# Patient Record
Sex: FEMALE | Race: WHITE | NOT HISPANIC OR LATINO
[De-identification: names, ages, dates, MRNs, and addresses within clinical notes are randomized per-mention and may not be internally consistent; named-entity substitution may affect disease eponyms.]

---

## 2022-06-30 ENCOUNTER — NON-APPOINTMENT (OUTPATIENT)
Age: 77
End: 2022-06-30

## 2022-06-30 PROBLEM — Z00.00 ENCOUNTER FOR PREVENTIVE HEALTH EXAMINATION: Status: ACTIVE | Noted: 2022-06-30

## 2022-07-05 ENCOUNTER — APPOINTMENT (OUTPATIENT)
Dept: NEUROSURGERY | Facility: CLINIC | Age: 77
End: 2022-07-05

## 2022-07-05 PROCEDURE — 99205 OFFICE O/P NEW HI 60 MIN: CPT

## 2022-07-05 NOTE — DATA REVIEWED
[de-identified] : MRI BRAIN AND IACS WITH AND WITHOUT CONTRAST: 6/28/22: IMPRESSION: 1. 5 x 4 x 4 mm enhancing lesion with broad dural base along the petrous left \par temporal bone and roof of the left IAC with resultant narrowing the porus \par acusticus with suggested encroachment on the left cranial nerve 7/8 complex at \par this level. Finding is suggestive of a meningioma rather than vestibular \par schwannoma. \par 2. Mild chronic microvascular ischemic changes without acute infarct or abnormal \par enhancement.

## 2022-07-05 NOTE — ASSESSMENT
[FreeTextEntry1] : Ms. Leslie presents with a history of left sided asymmetric sensorineural hearing loss. MRI brain and IACs with and without gadolinium 6/28/22 reviewed independently by me demonstrates a 5 x 4 x 4 mm enhancing lesion with broad dural base along the petrous left temporal bone and roof of the left IAC with resultant narrowing of the porus acusticus with suggested encroachment upon the left cranial nerve VII/VIII complex at this level. 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  \par \par Natural history discussed with the patient and her  in detail. Alternative management strategies reviewed. We discussed hearing preservation approaches for surgical resection, gamma knife radiosurgery, and clinical observation with surveillance audiometry and imaging. Given the patient's age, the small size of the lesion, and her symptomatic presentation, I have recommended consultation with Dr. Enio Barrios to discuss stereotactic radiosurgery in more detail. \par \par If the patient decides not to proceed with radiosurgical intervention, then I have offered to follow her clinically and obtain surveillance MRI brain and IACs with and without gadolinium in 6 months. I explained that she is at risk of progressive hearing loss, and have suggested audiometric surveillance as well, perhaps every 3 months though I would defer the timing of this to Dr. Morales. If the patient does decide to proceed with stereotactic radiosurgery, then Dr. Barrios will assume neurosurgical care of Ms Leslie and manage her longitudinally. \par \par I have asked the patient and her  to contact me for any symptomatic progression at which time we can obtain expedited follow-up imaging and discuss interventional options.\par \par A total of 60 minutes were spent relative to this encounter.\par \par \par

## 2022-07-05 NOTE — PHYSICAL EXAM
[General Appearance - Alert] : alert [General Appearance - In No Acute Distress] : in no acute distress [General Appearance - Well Nourished] : well nourished [Oriented To Time, Place, And Person] : oriented to person, place, and time [Impaired Insight] : insight and judgment were intact [Affect] : the affect was normal [Cranial Nerves Optic (II)] : visual acuity intact bilaterally,  pupils equal round and reactive to light [Cranial Nerves Oculomotor (III)] : extraocular motion intact [Cranial Nerves Trigeminal (V)] : facial sensation intact symmetrically [Cranial Nerves Facial (VII)] : face symmetrical [Cranial Nerves Glossopharyngeal (IX)] : tongue and palate midline [Cranial Nerves Accessory (XI - Cranial And Spinal)] : head turning and shoulder shrug symmetric [Cranial Nerves Hypoglossal (XII)] : there was no tongue deviation with protrusion [Motor Tone] : muscle tone was normal in all four extremities [Motor Strength] : muscle strength was normal in all four extremities [Sensation Tactile Decrease] : light touch was intact [Abnormal Walk] : normal gait [Balance] : balance was intact [FreeTextEntry5] : Decreased hearing ( left greater than right)

## 2022-07-05 NOTE — HISTORY OF PRESENT ILLNESS
[de-identified] : Ms. Leslie presents in neurosurgical consultation at the request of Dr. Primo Morales. She has a PMHx significant for Gilbert's syndrome, leukopenia, tubular adenoma, osteoporosis, hypothyroidism ,essential hypertension, wax impaction, osteoarthritis, sarcoidosis, CAD, atherosclerosis of the aorta, actinic keratosis, and basal cell carcinoma and acne, Dupuytren's contractures, hyperlipidemia, ectasia of left common iliac artery, thoracic aortic aneurysm, PAD. Patient reported several years of gradual hearing loss, left greater than right. Recent audiometric evaluation revealed left mild to severe sloping asymmetric, left sided sensorineural loss hearing loss and right borderline normal to mild sensorineural hearing loss. She tends to favor her right ear in social settings. Further workup prompted MRI brain and IACs (which is detailed below). Denies other neurological symptoms.

## 2022-07-14 ENCOUNTER — NON-APPOINTMENT (OUTPATIENT)
Age: 77
End: 2022-07-14

## 2022-07-20 ENCOUNTER — APPOINTMENT (OUTPATIENT)
Dept: NEUROSURGERY | Facility: CLINIC | Age: 77
End: 2022-07-20

## 2022-07-20 VITALS
HEIGHT: 67 IN | OXYGEN SATURATION: 96 % | BODY MASS INDEX: 19.15 KG/M2 | DIASTOLIC BLOOD PRESSURE: 87 MMHG | HEART RATE: 80 BPM | WEIGHT: 122 LBS | SYSTOLIC BLOOD PRESSURE: 162 MMHG | TEMPERATURE: 98.7 F

## 2022-07-20 DIAGNOSIS — Q16.5 CONGENITAL MALFORMATION OF INNER EAR: ICD-10-CM

## 2022-07-20 PROCEDURE — 99215 OFFICE O/P EST HI 40 MIN: CPT

## 2022-07-20 RX ORDER — ASPIRIN 81 MG
81 TABLET, DELAYED RELEASE (ENTERIC COATED) ORAL
Refills: 0 | Status: ACTIVE | COMMUNITY

## 2022-07-20 NOTE — END OF VISIT
[FreeTextEntry3] : I have seen the patient and reviewed the case together with PA and I agree with the final recommendations and plan of care.\par \par Enio Barrios MD\par Neurosurgery\par \par  [Time Spent: ___ minutes] : I have spent [unfilled] minutes of time on the encounter. [>50% of the face to face encounter time was spent on counseling and/or coordination of care for ___] : Greater than 50% of the face to face encounter time was spent on counseling and/or coordination of care for [unfilled]

## 2022-07-20 NOTE — PHYSICAL EXAM
[General Appearance - Alert] : alert [General Appearance - In No Acute Distress] : in no acute distress [Oriented To Time, Place, And Person] : oriented to person, place, and time [Impaired Insight] : insight and judgment were intact [Affect] : the affect was normal [Person] : oriented to person [Place] : oriented to place [Time] : oriented to time [Short Term Intact] : short term memory intact [Remote Intact] : remote memory intact [Span Intact] : the attention span was normal [Concentration Intact] : normal concentrating ability [Fluency] : fluency intact [Comprehension] : comprehension intact [Current Events] : adequate knowledge of current events [Past History] : adequate knowledge of personal past history [Vocabulary] : adequate range of vocabulary [Cranial Nerves Optic (II)] : visual acuity intact bilaterally,  pupils equal round and reactive to light [Cranial Nerves Oculomotor (III)] : extraocular motion intact [Cranial Nerves Trigeminal (V)] : facial sensation intact symmetrically [Cranial Nerves Facial (VII)] : face symmetrical [Cranial Nerves Glossopharyngeal (IX)] : tongue and palate midline [Cranial Nerves Accessory (XI - Cranial And Spinal)] : head turning and shoulder shrug symmetric [Cranial Nerves Hypoglossal (XII)] : there was no tongue deviation with protrusion [Motor Tone] : muscle tone was normal in all four extremities [Motor Strength] : muscle strength was normal in all four extremities [Sensation Tactile Decrease] : light touch was intact [No Muscle Atrophy] : normal bulk in all four extremities [Abnormal Walk] : normal gait [Balance] : balance was intact [2+] : Patella left 2+ [Past-pointing] : there was no past-pointing [Tremor] : no tremor present [FreeTextEntry5] : right side hearing intact, left side significantly decreased hearing

## 2022-07-20 NOTE — DATA REVIEWED
[de-identified] : MRI BRAIN AND IACS WITH AND WITHOUT CONTRAST: 6/28/22: IMPRESSION: 1. 5 x 4 x 4 mm enhancing lesion with broad dural base along the petrous left \par temporal bone and roof of the left IAC with resultant narrowing the porus \par acusticus with suggested encroachment on the left cranial nerve 7/8 complex at \par this level. Finding is suggestive of a meningioma rather than vestibular \par schwannoma. \par 2. Mild chronic microvascular ischemic changes without acute infarct or abnormal \par enhancement.

## 2022-07-20 NOTE — ASSESSMENT
[FreeTextEntry1] : I have discussed the natural history and treatment options for vestibular schwannoma vs IAC meningioma with the patient. I explained the different types of treatments: medical management, radiosurgery and surgery. I explained the indications of a combination of these treatment options. In the end, I have recommended Gamma Knife Radiosurgery to treat the left-sided vestibular schwannoma. After considering the options, the patient is leaning towards treatment with Gamma Knife radiosurgery. I have provided a referral to Dr. Isidoro Hardwick, my partner in Radiation Oncology at [facility] to begin coordination of care and will discuss the patient's decision with my partner, Dr. Renan Akhtar. The patient understands the plan of care and is in agreement. All questions answered to patient satisfaction.

## 2022-07-20 NOTE — HISTORY OF PRESENT ILLNESS
[de-identified] : JESUS LIM is a 77 year female with a PMH of  mild CAD, atrial septal aneurysm, HTN, HLD, PAD, hypothyroidism, osteoarthritis, osteoporosis who presents to the office today at the request of my partner Dr. Renan Akhtar for neurosurgical consultation due to vestibular schwannoma vs IAC meningioma and GKRS discussion. Over the last 4 years she has had progressive sensorineural hearing loss left > right. The patient has undergone imaging in the form of MRI Brain/IAC +/- at ** on 22 which revealed 5 x 4 x 4 mm enhancing lesion with broad dural base along the petrous left temporal bone and roof of the left IAC with resultant narrowing of the porus acusticus with suggested encroachment upon the left cranial nerve VII/VIII complex at this level. 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

## 2022-08-11 ENCOUNTER — TRANSCRIPTION ENCOUNTER (OUTPATIENT)
Age: 77
End: 2022-08-11

## 2022-08-11 PROBLEM — Z86.39 HISTORY OF HYPOTHYROIDISM: Status: RESOLVED | Noted: 2022-08-11 | Resolved: 2022-08-11

## 2022-08-11 PROBLEM — Z80.0 FAMILY HISTORY OF PANCREATIC CANCER: Status: ACTIVE | Noted: 2022-08-11

## 2022-08-11 PROBLEM — M19.90 OA (OSTEOARTHRITIS): Status: RESOLVED | Noted: 2022-08-11 | Resolved: 2022-08-11

## 2022-08-11 PROBLEM — Z86.79 HISTORY OF CORONARY ARTERY DISEASE: Status: RESOLVED | Noted: 2022-08-11 | Resolved: 2022-08-11

## 2022-08-11 PROBLEM — Z86.79 HISTORY OF HYPERTENSION: Status: RESOLVED | Noted: 2022-08-11 | Resolved: 2022-08-11

## 2022-08-11 PROBLEM — Z80.0 FAMILY HISTORY OF MALIGNANT NEOPLASM OF COLON: Status: ACTIVE | Noted: 2022-08-11

## 2022-08-11 PROBLEM — I25.3 ATRIAL SEPTAL ANEURYSM: Status: RESOLVED | Noted: 2022-08-11 | Resolved: 2022-08-11

## 2022-08-11 PROBLEM — Z86.39 HISTORY OF HYPERLIPIDEMIA: Status: RESOLVED | Noted: 2022-08-11 | Resolved: 2022-08-11

## 2022-08-11 RX ORDER — LEVOTHYROXINE SODIUM 112 UG/1
112 TABLET ORAL
Refills: 0 | Status: ACTIVE | COMMUNITY

## 2022-08-11 RX ORDER — RAMIPRIL 5 MG/1
5 CAPSULE ORAL
Refills: 0 | Status: ACTIVE | COMMUNITY

## 2022-08-11 RX ORDER — ATORVASTATIN CALCIUM 80 MG/1
TABLET, FILM COATED ORAL
Refills: 0 | Status: ACTIVE | COMMUNITY

## 2022-08-18 ENCOUNTER — APPOINTMENT (OUTPATIENT)
Dept: RADIATION ONCOLOGY | Facility: CLINIC | Age: 77
End: 2022-08-18

## 2022-08-18 ENCOUNTER — NON-APPOINTMENT (OUTPATIENT)
Age: 77
End: 2022-08-18

## 2022-08-18 VITALS
BODY MASS INDEX: 19.42 KG/M2 | WEIGHT: 124 LBS | OXYGEN SATURATION: 99 % | HEART RATE: 88 BPM | TEMPERATURE: 98 F | SYSTOLIC BLOOD PRESSURE: 173 MMHG | DIASTOLIC BLOOD PRESSURE: 90 MMHG

## 2022-08-18 DIAGNOSIS — Z80.0 FAMILY HISTORY OF MALIGNANT NEOPLASM OF DIGESTIVE ORGANS: ICD-10-CM

## 2022-08-18 DIAGNOSIS — Z87.891 PERSONAL HISTORY OF NICOTINE DEPENDENCE: ICD-10-CM

## 2022-08-18 DIAGNOSIS — Z86.79 PERSONAL HISTORY OF OTHER DISEASES OF THE CIRCULATORY SYSTEM: ICD-10-CM

## 2022-08-18 DIAGNOSIS — Z86.2 PERSONAL HISTORY OF DISEASES OF THE BLOOD AND BLOOD-FORMING ORGANS AND CERTAIN DISORDERS INVOLVING THE IMMUNE MECHANISM: ICD-10-CM

## 2022-08-18 DIAGNOSIS — Z86.39 PERSONAL HISTORY OF OTHER ENDOCRINE, NUTRITIONAL AND METABOLIC DISEASE: ICD-10-CM

## 2022-08-18 DIAGNOSIS — D33.3 BENIGN NEOPLASM OF CRANIAL NERVES: ICD-10-CM

## 2022-08-18 DIAGNOSIS — I25.3 ANEURYSM OF HEART: ICD-10-CM

## 2022-08-18 DIAGNOSIS — M19.90 UNSPECIFIED OSTEOARTHRITIS, UNSPECIFIED SITE: ICD-10-CM

## 2022-08-18 PROCEDURE — 99204 OFFICE O/P NEW MOD 45 MIN: CPT | Mod: 25

## 2022-08-18 NOTE — PHYSICAL EXAM
[No Focal Deficits] : no focal deficits [Sensation] : the sensory exam was normal to light touch and pinprick [Motor Exam] : the motor exam was normal [Normal] : oriented to person, place and time, the affect was normal, the mood was normal and not anxious [de-identified] : Decreased hearing on left side.  No other cranial nerve deficits noted.  No cerebellar signs

## 2022-08-18 NOTE — REVIEW OF SYSTEMS
[Loss of Hearing] : loss of hearing [Dizziness] : no dizziness [Difficulty Walking] : no difficulty walking

## 2022-08-18 NOTE — VITALS
[Maximal Pain Intensity: 0/10] : 0/10 [90: Able to carry normal activity; minor signs or symptoms of disease.] : 90: Able to carry normal activity; minor signs or symptoms of disease.  [5 - Distress Level] : Distress Level: 5 [FreeTextEntry7] : white coat syndrome

## 2022-08-18 NOTE — HISTORY OF PRESENT ILLNESS
[FreeTextEntry1] : \par JESUS LIM is a 77 year female with a PMH of mild CAD, atrial septal aneurysm, HTN, HLD, PAD, hypothyroidism, osteoarthritis, osteoporosis who presents to the office today  due to vestibular schwannoma vs IAC meningioma and GKRS discussion.\par  Over the last 4 years she has had progressive sensorineural hearing loss left > right. The patient has undergone imaging in the form of MRI Brain/IAC +/- at ** on 6/28/22 which revealed 5 x 4 x 4 mm enhancing lesion with broad dural base along the petrous left temporal bone and roof of the left IAC with resultant narrowing of the porus acusticus with suggested encroachment upon the left cranial nerve VII/VIII complex at this level. Findings are consistent with vestibular schwannoma versus meningioma.\par \par MRI: MRI BRAIN AND IACS WITH AND WITHOUT CONTRAST: 6/28/22: IMPRESSION: 1. 5 x 4 x 4 mm enhancing lesion with broad dural base along the petrous left \par temporal bone and roof of the left IAC with resultant narrowing the porus \par acusticus with suggested encroachment on the left cranial nerve 7/8 complex at \par this level. Finding is suggestive of a meningioma rather than vestibular \par schwannoma. \par 2. Mild chronic microvascular ischemic changes without acute infarct or abnormal \par enhancement. \par Today 8/18/2022 She is here for consideration of radiation therapy.  She reports only moderate hearing loss in the left ear.  No balance issues or tinnitus noted.  \par

## 2022-08-27 ENCOUNTER — RESULT REVIEW (OUTPATIENT)
Age: 77
End: 2022-08-27

## 2022-08-29 ENCOUNTER — RESULT REVIEW (OUTPATIENT)
Age: 77
End: 2022-08-29

## 2022-08-30 ENCOUNTER — APPOINTMENT (OUTPATIENT)
Dept: NEUROSURGERY | Facility: HOSPITAL | Age: 77
End: 2022-08-30

## 2022-08-30 PROCEDURE — 61800 APPLY SRS HEADFRAME ADD-ON: CPT

## 2022-08-30 PROCEDURE — 61798 SRS CRANIAL LESION COMPLEX: CPT

## 2022-08-30 NOTE — PROCEDURE
[FreeTextEntry1] : Gamma knife radiosurgery for left side vestibular schwannoma  [FreeTextEntry2] : Ms. JESUS LIM is a 77 year year-old patient with PMH of CAD, HTN, HLD, hypothyroidism, osteoporosis, PAD  who was seen for neurosurgical consultation regarding management options for left side CP angle tumor, consistent with VS or meningioma. Patient has a 4-5 years history of progressive hearing loss on the left side to the point that she has no serviceable hearing on then left side.  \par Patient denies seizures, HA,N/V, visual deficits, double vision, face pain, face asymmetry, tinnitus, swallowing difficulties, motor or sensation deficits, balance or gait problems, speech or language issues.\par \par I have discussed the natural history and treatment options for the left side CP angle tumor with the patient. I explained the indications for observation and imaging surveillance, medical management with antiepileptic medications and steroids, chemotherapy, radiation therapy, radiosurgery and surgery. I explained the different types of differential diagnosis including the most likely which were vestibular schwannoma or meningioma and the chances that this image could also represent a different lesion. I explained the indications of a combination of these treatment options. I discussed the risks, benefits, possible complications and expected outcome related to each treatment option. The risks of surgery were discussed in detail including but not limited to postoperative infection at the surgical site, hospital acquired pneumonia, hospital acquired urinary tract infection, postoperative meningitis, osteomyelitis, wound dehiscence, CSF leak, stroke (ischemic and hemorrhagic), postoperative seizures, worsening neurological function due to cranial nerve or brain injury, cardiovascular complications (MI, PE, DVT) and I also explained that these complications could lead to sepsis, coma or even death. I discussed the fact that some of these complications may require a second surgical procedure to correct them. In the end my recommendation is for gamma knife radiosurgery for her left CP angle tumor. Patient understood and agreed with our plan of care and decided to move forward with it. All questions were answered.\par \par \par \par  [FreeTextEntry3] : \par  \par PREOPERATIVE DIAGNOSIS: Left side vestibular schwannoma \par  \par POSTOPERATIVE DIAGNOSIS: Same\par  \par OPERATION: Stereotactic frame application and gamma knife radiosurgery.\par  \par ANESTHESIA: Locally injected 1% Lidocaine with intravenous anxiolytic Versed.\par  \par SURGEON: Enio Barriso MD. - Frame placement and planning, immediately available for all other aspects of case.\par  \par RADIATION ONCOLOGIST: Isidoro Hardwick MD\par  \par ASSISTANTS: NONE\par  \par SPECIMEN: None\par  \par EBL: 0 ccs\par  \par OPERATIVE INDICATIONS: The full clinical history and indications for treatment were discussed in the initial neurosurgery visit note.  The indications, risks, benefits, and alternatives were discussed with the patient who asked us to proceed.\par The patient is aware that this may be one of several staged procedures in the management of this disorder.\par  \par OPERATIVE FINDINGS:  Successful targeting of left CP lila tumor.\par  \par DESCRIPTION OF PROCEDURE: The patient was admitted to the [facility] where intravenous access was obtained.  The [place] stereotactic frame was placed with the use of intravenous sedation and local anesthetic.  The frame was placed without any difficulty and appropriate stereotactic measurements were made.  The patient then underwent stereotactic imaging.  The scans were loaded in the planning computer and Le[place]ell gamma plan was used to perform stereotactic radiosurgery dose planning.  The lesion was treated as follows:\par  \par Target Lt VS\par \par             Location:Lt VS\par             Prescription: 13 Gy to the 50% local isodose line The plan covers 100% of the target.\par  \par  \par After the usual  procedures were performed, stereotactic radiosurgery was delivered with use of the Gamma Knife. Following the completion of the last shot, the stereotactic frame was removed and the pin sites were dressed.\par  \par The Gamma Knife checklist and time outs were performed during this procedure.\par \par  \par \par \par Enio Barrios MD\par Neurosurgery\par

## 2023-03-15 ENCOUNTER — APPOINTMENT (OUTPATIENT)
Dept: RADIATION ONCOLOGY | Facility: CLINIC | Age: 78
End: 2023-03-15

## 2023-04-25 ENCOUNTER — APPOINTMENT (OUTPATIENT)
Dept: RADIATION ONCOLOGY | Facility: CLINIC | Age: 78
End: 2023-04-25